# Patient Record
Sex: MALE | URBAN - METROPOLITAN AREA
[De-identification: names, ages, dates, MRNs, and addresses within clinical notes are randomized per-mention and may not be internally consistent; named-entity substitution may affect disease eponyms.]

---

## 2023-12-08 ENCOUNTER — APPOINTMENT (RX ONLY)
Dept: URBAN - METROPOLITAN AREA CLINIC 38 | Facility: CLINIC | Age: 53
Setting detail: DERMATOLOGY
End: 2023-12-08

## 2023-12-08 DIAGNOSIS — L30.9 DERMATITIS, UNSPECIFIED: ICD-10-CM

## 2023-12-08 DIAGNOSIS — L85.3 XEROSIS CUTIS: ICD-10-CM

## 2023-12-08 PROCEDURE — ? TREATMENT REGIMEN

## 2023-12-08 PROCEDURE — ? PRESCRIPTION

## 2023-12-08 PROCEDURE — ? COUNSELING

## 2023-12-08 PROCEDURE — 99203 OFFICE O/P NEW LOW 30 MIN: CPT

## 2023-12-08 RX ORDER — TRIAMCINOLONE ACETONIDE 1 MG/G
CREAM TOPICAL BID
Qty: 454 | Refills: 1 | Status: ERX | COMMUNITY
Start: 2023-12-08

## 2023-12-08 RX ORDER — TACROLIMUS 0.3 MG/G
OINTMENT TOPICAL
Qty: 100 | Refills: 2 | Status: ERX | COMMUNITY
Start: 2023-12-08

## 2023-12-08 RX ADMIN — TACROLIMUS: 0.3 OINTMENT TOPICAL at 00:00

## 2023-12-08 RX ADMIN — TRIAMCINOLONE ACETONIDE: 1 CREAM TOPICAL at 00:00

## 2023-12-08 NOTE — PROCEDURE: TREATMENT REGIMEN
Detail Level: Zone
Initiate Treatment: tacrolimus 0.03 % topical ointment \\nQuantity: 100.0 g  Days Supply: 30\\nSig: Apply a thin layer to arm twice a day as needed
Initiate Treatment: triamcinolone acetonide 0.1 % topical cream BID\\nQuantity: 454.0 g\\nSig: Apply a thin layer to foot and elbow twice a day for 2 weeks on, 2 weeks off. Repeat as needed

## 2024-01-10 ENCOUNTER — APPOINTMENT (RX ONLY)
Dept: URBAN - METROPOLITAN AREA CLINIC 38 | Facility: CLINIC | Age: 54
Setting detail: DERMATOLOGY
End: 2024-01-10

## 2024-01-10 DIAGNOSIS — L85.3 XEROSIS CUTIS: ICD-10-CM | Status: IMPROVED

## 2024-01-10 DIAGNOSIS — L30.9 DERMATITIS, UNSPECIFIED: ICD-10-CM | Status: IMPROVED

## 2024-01-10 DIAGNOSIS — L57.0 ACTINIC KERATOSIS: ICD-10-CM

## 2024-01-10 PROCEDURE — ? LIQUID NITROGEN

## 2024-01-10 PROCEDURE — 99213 OFFICE O/P EST LOW 20 MIN: CPT | Mod: 25

## 2024-01-10 PROCEDURE — 17000 DESTRUCT PREMALG LESION: CPT

## 2024-01-10 PROCEDURE — ? COUNSELING

## 2024-01-10 PROCEDURE — 17003 DESTRUCT PREMALG LES 2-14: CPT

## 2024-01-10 PROCEDURE — ? TREATMENT REGIMEN

## 2024-01-10 ASSESSMENT — SEVERITY ASSESSMENT: SEVERITY: ALMOST CLEAR

## 2024-01-10 ASSESSMENT — LOCATION DETAILED DESCRIPTION DERM
LOCATION DETAILED: LEFT PROXIMAL DORSAL FOREARM
LOCATION DETAILED: LEFT PROXIMAL ULNAR DORSAL FOREARM
LOCATION DETAILED: LEFT DISTAL DORSAL FOREARM

## 2024-01-10 ASSESSMENT — LOCATION ZONE DERM: LOCATION ZONE: ARM

## 2024-01-10 ASSESSMENT — LOCATION SIMPLE DESCRIPTION DERM: LOCATION SIMPLE: LEFT FOREARM
